# Patient Record
Sex: MALE
[De-identification: names, ages, dates, MRNs, and addresses within clinical notes are randomized per-mention and may not be internally consistent; named-entity substitution may affect disease eponyms.]

---

## 2020-04-25 ENCOUNTER — MESSAGE (OUTPATIENT)
Age: 45
End: 2020-04-25

## 2023-06-01 PROBLEM — Z00.00 ENCOUNTER FOR PREVENTIVE HEALTH EXAMINATION: Status: ACTIVE | Noted: 2023-06-01

## 2024-08-15 ENCOUNTER — EMERGENCY (EMERGENCY)
Facility: HOSPITAL | Age: 49
LOS: 1 days | Discharge: HOME | End: 2024-08-15
Attending: EMERGENCY MEDICINE | Admitting: EMERGENCY MEDICINE
Payer: COMMERCIAL

## 2024-08-15 VITALS
TEMPERATURE: 98 F | SYSTOLIC BLOOD PRESSURE: 109 MMHG | OXYGEN SATURATION: 98 % | DIASTOLIC BLOOD PRESSURE: 96 MMHG | RESPIRATION RATE: 26 BRPM | HEART RATE: 84 BPM

## 2024-08-15 VITALS
WEIGHT: 149.91 LBS | TEMPERATURE: 100 F | HEART RATE: 124 BPM | SYSTOLIC BLOOD PRESSURE: 126 MMHG | DIASTOLIC BLOOD PRESSURE: 82 MMHG | RESPIRATION RATE: 22 BRPM | OXYGEN SATURATION: 93 %

## 2024-08-15 LAB
ALBUMIN SERPL ELPH-MCNC: 3.7 G/DL — SIGNIFICANT CHANGE UP (ref 3.3–5)
ALP SERPL-CCNC: 70 U/L — SIGNIFICANT CHANGE UP (ref 40–120)
ALT FLD-CCNC: 35 U/L — SIGNIFICANT CHANGE UP (ref 4–41)
ANION GAP SERPL CALC-SCNC: 13 MMOL/L — SIGNIFICANT CHANGE UP (ref 7–14)
APTT BLD: 35.8 SEC — HIGH (ref 24.5–35.6)
AST SERPL-CCNC: 27 U/L — SIGNIFICANT CHANGE UP (ref 4–40)
BASOPHILS # BLD AUTO: 0.03 K/UL — SIGNIFICANT CHANGE UP (ref 0–0.2)
BASOPHILS NFR BLD AUTO: 0.4 % — SIGNIFICANT CHANGE UP (ref 0–2)
BILIRUB SERPL-MCNC: 0.4 MG/DL — SIGNIFICANT CHANGE UP (ref 0.2–1.2)
BLOOD GAS VENOUS COMPREHENSIVE RESULT: SIGNIFICANT CHANGE UP
BUN SERPL-MCNC: 21 MG/DL — SIGNIFICANT CHANGE UP (ref 7–23)
CALCIUM SERPL-MCNC: 9.2 MG/DL — SIGNIFICANT CHANGE UP (ref 8.4–10.5)
CHLORIDE SERPL-SCNC: 98 MMOL/L — SIGNIFICANT CHANGE UP (ref 98–107)
CK SERPL-CCNC: 71 U/L — SIGNIFICANT CHANGE UP (ref 30–200)
CO2 SERPL-SCNC: 24 MMOL/L — SIGNIFICANT CHANGE UP (ref 22–31)
CREAT SERPL-MCNC: 0.77 MG/DL — SIGNIFICANT CHANGE UP (ref 0.5–1.3)
CRP SERPL-MCNC: 95.8 MG/L — HIGH
D DIMER BLD IA.RAPID-MCNC: 1198 NG/ML DDU — HIGH
EGFR: 110 ML/MIN/1.73M2 — SIGNIFICANT CHANGE UP
EOSINOPHIL # BLD AUTO: 0.09 K/UL — SIGNIFICANT CHANGE UP (ref 0–0.5)
EOSINOPHIL NFR BLD AUTO: 1.1 % — SIGNIFICANT CHANGE UP (ref 0–6)
ERYTHROCYTE [SEDIMENTATION RATE] IN BLOOD: 67 MM/HR — HIGH (ref 1–15)
FLUAV AG NPH QL: SIGNIFICANT CHANGE UP
FLUBV AG NPH QL: SIGNIFICANT CHANGE UP
GLUCOSE SERPL-MCNC: 90 MG/DL — SIGNIFICANT CHANGE UP (ref 70–99)
HCT VFR BLD CALC: 43.3 % — SIGNIFICANT CHANGE UP (ref 39–50)
HGB BLD-MCNC: 14.9 G/DL — SIGNIFICANT CHANGE UP (ref 13–17)
IANC: 5.66 K/UL — SIGNIFICANT CHANGE UP (ref 1.8–7.4)
IMM GRANULOCYTES NFR BLD AUTO: 0.6 % — SIGNIFICANT CHANGE UP (ref 0–0.9)
INR BLD: 1.27 RATIO — HIGH (ref 0.85–1.18)
LYMPHOCYTES # BLD AUTO: 1.98 K/UL — SIGNIFICANT CHANGE UP (ref 1–3.3)
LYMPHOCYTES # BLD AUTO: 23.4 % — SIGNIFICANT CHANGE UP (ref 13–44)
MCHC RBC-ENTMCNC: 31.6 PG — SIGNIFICANT CHANGE UP (ref 27–34)
MCHC RBC-ENTMCNC: 34.4 GM/DL — SIGNIFICANT CHANGE UP (ref 32–36)
MCV RBC AUTO: 91.9 FL — SIGNIFICANT CHANGE UP (ref 80–100)
MONOCYTES # BLD AUTO: 0.66 K/UL — SIGNIFICANT CHANGE UP (ref 0–0.9)
MONOCYTES NFR BLD AUTO: 7.8 % — SIGNIFICANT CHANGE UP (ref 2–14)
NEUTROPHILS # BLD AUTO: 5.66 K/UL — SIGNIFICANT CHANGE UP (ref 1.8–7.4)
NEUTROPHILS NFR BLD AUTO: 66.7 % — SIGNIFICANT CHANGE UP (ref 43–77)
NRBC # BLD: 0 /100 WBCS — SIGNIFICANT CHANGE UP (ref 0–0)
NRBC # FLD: 0 K/UL — SIGNIFICANT CHANGE UP (ref 0–0)
PLATELET # BLD AUTO: 346 K/UL — SIGNIFICANT CHANGE UP (ref 150–400)
POTASSIUM SERPL-MCNC: 4.5 MMOL/L — SIGNIFICANT CHANGE UP (ref 3.5–5.3)
POTASSIUM SERPL-SCNC: 4.5 MMOL/L — SIGNIFICANT CHANGE UP (ref 3.5–5.3)
PROT SERPL-MCNC: 8.3 G/DL — SIGNIFICANT CHANGE UP (ref 6–8.3)
PROTHROM AB SERPL-ACNC: 14.1 SEC — HIGH (ref 9.5–13)
RBC # BLD: 4.71 M/UL — SIGNIFICANT CHANGE UP (ref 4.2–5.8)
RBC # FLD: 11.5 % — SIGNIFICANT CHANGE UP (ref 10.3–14.5)
RSV RNA NPH QL NAA+NON-PROBE: SIGNIFICANT CHANGE UP
SARS-COV-2 RNA SPEC QL NAA+PROBE: SIGNIFICANT CHANGE UP
SODIUM SERPL-SCNC: 135 MMOL/L — SIGNIFICANT CHANGE UP (ref 135–145)
TROPONIN T, HIGH SENSITIVITY RESULT: <6 NG/L — SIGNIFICANT CHANGE UP
WBC # BLD: 8.47 K/UL — SIGNIFICANT CHANGE UP (ref 3.8–10.5)
WBC # FLD AUTO: 8.47 K/UL — SIGNIFICANT CHANGE UP (ref 3.8–10.5)

## 2024-08-15 PROCEDURE — 99285 EMERGENCY DEPT VISIT HI MDM: CPT

## 2024-08-15 PROCEDURE — 93010 ELECTROCARDIOGRAM REPORT: CPT

## 2024-08-15 PROCEDURE — 71275 CT ANGIOGRAPHY CHEST: CPT | Mod: 26,MC

## 2024-08-15 PROCEDURE — 73562 X-RAY EXAM OF KNEE 3: CPT | Mod: 26,50

## 2024-08-15 PROCEDURE — 71046 X-RAY EXAM CHEST 2 VIEWS: CPT | Mod: 26

## 2024-08-15 NOTE — ED ADULT TRIAGE NOTE - CHIEF COMPLAINT QUOTE
Patient complains of pain in b/l knees, shoulders, wrists and elbows for the past two weeks. Patient denies any recent falls or trauma at this time. Patient also complains of lower with sided rib pain, denies taking any medication prior to arrival. Patient denies any CP or SOB, respirations equal and unlabored on room air. No pertinent medical history.

## 2024-08-15 NOTE — ED PROVIDER NOTE - ATTENDING CONTRIBUTION TO CARE
I performed a face-to-face evaluation of the patient and performed a history and physical examination. I agree with the history and physical examination. If this was a PA visit, I personally saw the patient with the PA and performed a substantive portion of the visit including all aspects of the medical decision making.    F, chills, joint aches x 2 wks. Started w/ body aches, sore throat, cough. Most Sx improved. Has residual cough and joint aches (mostly bilat knees). Today, R CP worse w/ cough and movements. Mild tachycardia and CP. DDx: PE, PNA, West Nile, Lyme, auto-immune (eg, myositis). FHx RA. Check d-dimer, BCx, West Nile, Lyme, RF, anti-CCP, knee x-rays, CK. KEANU, ESR, CRP, CXR, EKG, trop.

## 2024-08-15 NOTE — ED PROVIDER NOTE - OBJECTIVE STATEMENT
Pt is a 48 y/o M with no PMHx presenting to the ED with myalgias, arthralgias, R lower rib pain. About 2 weeks ago, the patient developed a sore throat, fevers, night sweats, chills, headaches, neck stiffness, coughing (with clear sputum), forearm pain. The symptoms progressively resolved, aside from his fevers and cough, but on 08/04 he developed weakness throughout his entire body and was unable to walk. The next day, he required lidocaine patches throughout his entire body due to pain on his knees, shoulders, inability to close his hand). He's continued to have daily fevers, cough, migratory polyarthralgia to joints but mostly in his knee. 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 He recently went to urgent care which noted he had tachycardia and he tested negative for viral illnesses and strep throat. He has sustained tachycardia today.  Of note, his mother has rheumatoid arthritis.  Otherwise, ROS negative today for any chills, nausea, vomiting, diarrhea, constipation, urinary sx, headache, double vision. Denies recent travel, denies recent hiking/upstate travel.    PMHx: None    Meds: None    Allergies: NKDA    Surgical Hx: None    FMHx: Rheumatoid arthritis and asthma in Mother    Social: Occasional cigarette smoking, social alcohol use, denies illicit drug use.

## 2024-08-15 NOTE — ED PROVIDER NOTE - NSFOLLOWUPINSTRUCTIONS_ED_ALL_ED_FT
Please follow up with your doctor within 1 week. Bring copies of your results with you (provided in your discharge paperwork). Please stay well-hydrated.    You may take 500-1000 mg acetaminophen (tylenol) every 6 hours, as needed for pain.  You may take 600 mg ibuprofen every 8 hours, with food, as needed for pain.  You can take tylenol and ibuprofen at the same time.     PLEASE RETURN TO ED FOR NEW OR WORSENING SYMPTOMS (intractable nausea/vomiting, severe bleeding, fever over 100.4F, loss of movement of one or more limbs, seizure)    Finally, please follow up with [your PCP/a specialist] regarding the incidental findings seen on your imaging (please see discharge paperwork for results).      Please follow up with Rheumatology.

## 2024-08-15 NOTE — ED ADULT NURSE NOTE - OBJECTIVE STATEMENT
This is a 49 y/0 male complaining of joint and lower extremity pain, intermittent fever and non productive cough, 5/10 discomfort. Alert and oriented x 4. Denies past medical history. Chest is clear, abdomen is soft bowel sounds present, non tender on palpation. Moving all limbs pain noted on lower extremity. Denies n/v/d. Denies headache, noted to be tachycardic on the monitor, Denies trauma. Denies neck tenderness, Equal pedal and radial pulses. Dr Gongora informed. IV initiated on left AC g 20, blood work sent to lab, Waiting for results. .

## 2024-08-15 NOTE — ED PROVIDER NOTE - CLINICAL SUMMARY MEDICAL DECISION MAKING FREE TEXT BOX
Pt is a 48 y/o M with no PMHx presenting to the ED with myalgias, arthralgias, R lower rib pain, tachycardia, daily fevers, coughing that started 2 weeks ago with URI-symptoms that have resolved aside from his cough. FMHx of rheumatoid arthritis. Pt is tachycardic on presentation, physical exam otherwise normal.  DDx is broad: PE vs autoimmune vs west-nile virus vs tick-born illness vs other etiology    PLAN:  CBC, CMP, Coags, D-Dimer, Troponin  West Nile IgG/IgM  CXR  VBG  CRP, ESR, CK + Autoimmune labs  Flu/RSV/COVID  UA

## 2024-08-15 NOTE — ED PROVIDER NOTE - PATIENT PORTAL LINK FT
You can access the FollowMyHealth Patient Portal offered by [facility] by registering at the following website: [email or web address]. By joining [facility]’s FollowMyHealth portal, you will also be able to view your health information using other applications (apps) compatible with our system.

## 2024-08-16 LAB
B BURGDOR C6 AB SER-ACNC: NEGATIVE — SIGNIFICANT CHANGE UP
B BURGDOR IGG+IGM SER-ACNC: 0.16 INDEX — SIGNIFICANT CHANGE UP (ref 0.01–0.9)
CCP IGG SERPL-ACNC: <8 U/ML — SIGNIFICANT CHANGE UP
RF+CCP IGG SER-IMP: NEGATIVE — SIGNIFICANT CHANGE UP

## 2024-08-20 LAB
ANA TITR SER: NEGATIVE — SIGNIFICANT CHANGE UP
CULTURE RESULTS: SIGNIFICANT CHANGE UP
CULTURE RESULTS: SIGNIFICANT CHANGE UP
RF IGA SER-ACNC: 6 U — SIGNIFICANT CHANGE UP
RF IGG SER-ACNC: <5 U — SIGNIFICANT CHANGE UP
RF IGM SER-ACNC: 29 U — HIGH
SPECIMEN SOURCE: SIGNIFICANT CHANGE UP
SPECIMEN SOURCE: SIGNIFICANT CHANGE UP
WNV IGG TITR FLD: NEGATIVE — SIGNIFICANT CHANGE UP
WNV IGM SPEC QL: NEGATIVE — SIGNIFICANT CHANGE UP